# Patient Record
Sex: MALE | Race: WHITE | Employment: FULL TIME | URBAN - METROPOLITAN AREA
[De-identification: names, ages, dates, MRNs, and addresses within clinical notes are randomized per-mention and may not be internally consistent; named-entity substitution may affect disease eponyms.]

---

## 2017-03-19 ENCOUNTER — HOSPITAL ENCOUNTER (OUTPATIENT)
Age: 52
Discharge: HOME | End: 2017-03-19
Attending: FAMILY MEDICINE
Payer: COMMERCIAL

## 2017-03-19 VITALS
SYSTOLIC BLOOD PRESSURE: 137 MMHG | RESPIRATION RATE: 18 BRPM | DIASTOLIC BLOOD PRESSURE: 96 MMHG | HEART RATE: 99 BPM | OXYGEN SATURATION: 98 % | WEIGHT: 230 LBS | TEMPERATURE: 99 F

## 2017-03-19 DIAGNOSIS — J06.9 UPPER RESPIRATORY TRACT INFECTION, UNSPECIFIED TYPE: Primary | ICD-10-CM

## 2017-03-19 PROCEDURE — 99213 OFFICE O/P EST LOW 20 MIN: CPT

## 2017-03-19 PROCEDURE — 99203 OFFICE O/P NEW LOW 30 MIN: CPT

## 2017-03-19 RX ORDER — OMEPRAZOLE 20 MG/1
20 CAPSULE, DELAYED RELEASE ORAL
COMMUNITY
End: 2021-06-07

## 2017-03-19 RX ORDER — CODEINE PHOSPHATE AND GUAIFENESIN 10; 100 MG/5ML; MG/5ML
SOLUTION ORAL NIGHTLY PRN
Qty: 120 ML | Refills: 0 | Status: SHIPPED | OUTPATIENT
Start: 2017-03-19 | End: 2017-09-28

## 2017-03-19 RX ORDER — FLUTICASONE PROPIONATE 50 MCG
SPRAY, SUSPENSION (ML) NASAL
Qty: 1 INHALER | Refills: 0 | Status: SHIPPED | OUTPATIENT
Start: 2017-03-19 | End: 2017-09-28

## 2017-03-19 NOTE — ED PROVIDER NOTES
Patient Seen in: [facility]    History   Patient presents with:  Cough/URI    Stated Complaint: COUGH X 4 DAYS    HPI    This 20-year-old male presents to the office with a four-day history of nasal congestion, postnasal drip, s distress, A and O times 3  HEAD: Normocephalic, atraumatic  EYES: Sclera anicteric,  conjunctiva normal.  EARS: Tympanic membranes normal, EAC's normal.  NOSE: Turbinates mildly swollen, no bleeding noted.   PHARYNX:  Cobblestoning is noted, no exudates not or as long as you are congested. You may continue with the Mucinex during the daytime. You may take 5-10 mL of Cheratussin with codeine at bedtime as needed for cough. Do not drive, drink alcohol or operate machinery while on this medication.   Push flui

## 2017-09-03 ENCOUNTER — APPOINTMENT (OUTPATIENT)
Dept: CT IMAGING | Facility: HOSPITAL | Age: 52
End: 2017-09-03
Attending: EMERGENCY MEDICINE
Payer: COMMERCIAL

## 2017-09-03 ENCOUNTER — HOSPITAL ENCOUNTER (EMERGENCY)
Facility: HOSPITAL | Age: 52
Discharge: HOME | End: 2017-09-03
Attending: EMERGENCY MEDICINE
Payer: COMMERCIAL

## 2017-09-03 ENCOUNTER — APPOINTMENT (OUTPATIENT)
Dept: GENERAL RADIOLOGY | Facility: HOSPITAL | Age: 52
End: 2017-09-03
Attending: EMERGENCY MEDICINE
Payer: COMMERCIAL

## 2017-09-03 VITALS
OXYGEN SATURATION: 99 % | BODY MASS INDEX: 29.82 KG/M2 | WEIGHT: 225 LBS | HEART RATE: 87 BPM | RESPIRATION RATE: 18 BRPM | DIASTOLIC BLOOD PRESSURE: 94 MMHG | HEIGHT: 73 IN | SYSTOLIC BLOOD PRESSURE: 135 MMHG

## 2017-09-03 DIAGNOSIS — R55 SYNCOPE AND COLLAPSE: Primary | ICD-10-CM

## 2017-09-03 DIAGNOSIS — T07.XXXA ABRASIONS OF MULTIPLE SITES: ICD-10-CM

## 2017-09-03 DIAGNOSIS — S09.90XA INJURY OF HEAD, INITIAL ENCOUNTER: ICD-10-CM

## 2017-09-03 LAB
ATRIAL RATE: 62 BPM
BASOPHILS # BLD AUTO: 0.04 X10(3) UL (ref 0–0.1)
BASOPHILS NFR BLD AUTO: 0.4 %
EOSINOPHIL # BLD AUTO: 0.08 X10(3) UL (ref 0–0.3)
EOSINOPHIL NFR BLD AUTO: 0.8 %
ERYTHROCYTE [DISTWIDTH] IN BLOOD BY AUTOMATED COUNT: 12.4 % (ref 11.5–16)
HCT VFR BLD AUTO: 47.7 % (ref 37–53)
HGB BLD-MCNC: 16.5 G/DL (ref 13–17)
IMMATURE GRANULOCYTE COUNT: 0.1 X10(3) UL (ref 0–1)
IMMATURE GRANULOCYTE RATIO %: 1 %
LYMPHOCYTES # BLD AUTO: 1.09 X10(3) UL (ref 0.9–4)
LYMPHOCYTES NFR BLD AUTO: 11.2 %
MCH RBC QN AUTO: 31.9 PG (ref 27–33.2)
MCHC RBC AUTO-ENTMCNC: 34.6 G/DL (ref 31–37)
MCV RBC AUTO: 92.1 FL (ref 80–99)
MONOCYTES # BLD AUTO: 0.64 X10(3) UL (ref 0.1–0.6)
MONOCYTES NFR BLD AUTO: 6.6 %
NEUTROPHIL ABS PRELIM: 7.81 X10 (3) UL (ref 1.3–6.7)
NEUTROPHILS # BLD AUTO: 7.81 X10(3) UL (ref 1.3–6.7)
NEUTROPHILS NFR BLD AUTO: 80 %
P AXIS: 29 DEGREES
P-R INTERVAL: 220 MS
PLATELET # BLD AUTO: 202 10(3)UL (ref 150–450)
Q-T INTERVAL: 406 MS
QRS DURATION: 94 MS
QTC CALCULATION (BEZET): 412 MS
R AXIS: -1 DEGREES
RBC # BLD AUTO: 5.18 X10(6)UL (ref 4.3–5.7)
RED CELL DISTRIBUTION WIDTH-SD: 41.9 FL (ref 35.1–46.3)
T AXIS: 6 DEGREES
VENTRICULAR RATE: 62 BPM
WBC # BLD AUTO: 9.8 X10(3) UL (ref 4–13)

## 2017-09-03 PROCEDURE — 85025 COMPLETE CBC W/AUTO DIFF WBC: CPT | Performed by: EMERGENCY MEDICINE

## 2017-09-03 PROCEDURE — 96360 HYDRATION IV INFUSION INIT: CPT

## 2017-09-03 PROCEDURE — 70450 CT HEAD/BRAIN W/O DYE: CPT | Performed by: EMERGENCY MEDICINE

## 2017-09-03 PROCEDURE — 93005 ELECTROCARDIOGRAM TRACING: CPT

## 2017-09-03 PROCEDURE — 93010 ELECTROCARDIOGRAM REPORT: CPT

## 2017-09-03 PROCEDURE — 99285 EMERGENCY DEPT VISIT HI MDM: CPT

## 2017-09-03 PROCEDURE — 73562 X-RAY EXAM OF KNEE 3: CPT | Performed by: EMERGENCY MEDICINE

## 2017-09-03 PROCEDURE — 90471 IMMUNIZATION ADMIN: CPT

## 2017-09-03 PROCEDURE — 72125 CT NECK SPINE W/O DYE: CPT | Performed by: EMERGENCY MEDICINE
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
## 2017-09-28 ENCOUNTER — APPOINTMENT (OUTPATIENT)
Dept: CT IMAGING | Age: 52
End: 2017-09-28
Attending: FAMILY MEDICINE
Payer: COMMERCIAL

## 2017-09-28 ENCOUNTER — HOSPITAL ENCOUNTER (OUTPATIENT)
Age: 52
Discharge: HOME | End: 2017-09-28
Attending: FAMILY MEDICINE
Payer: COMMERCIAL

## 2017-09-28 VITALS
OXYGEN SATURATION: 97 % | SYSTOLIC BLOOD PRESSURE: 144 MMHG | DIASTOLIC BLOOD PRESSURE: 93 MMHG | RESPIRATION RATE: 20 BRPM | HEART RATE: 88 BPM | BODY MASS INDEX: 30 KG/M2 | TEMPERATURE: 98 F | WEIGHT: 225 LBS

## 2017-09-28 DIAGNOSIS — S09.90XD INJURY OF HEAD, SUBSEQUENT ENCOUNTER: Primary | ICD-10-CM

## 2017-09-28 DIAGNOSIS — S16.1XXD STRAIN OF NECK MUSCLE, SUBSEQUENT ENCOUNTER: ICD-10-CM

## 2017-09-28 PROCEDURE — 70450 CT HEAD/BRAIN W/O DYE: CPT | Performed by: FAMILY MEDICINE

## 2017-09-28 PROCEDURE — 99214 OFFICE O/P EST MOD 30 MIN: CPT
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
## 2019-12-23 ENCOUNTER — HOSPITAL (OUTPATIENT)
Dept: OTHER | Age: 54
End: 2019-12-23
Attending: EMERGENCY MEDICINE

## 2019-12-26 LAB
CULTURE STREP GRP A (STTH) HL: NORMAL

## 2021-06-04 ENCOUNTER — APPOINTMENT (OUTPATIENT)
Dept: GENERAL RADIOLOGY | Age: 56
End: 2021-06-04
Attending: PHYSICIAN ASSISTANT
Payer: COMMERCIAL

## 2021-06-04 ENCOUNTER — TELEPHONE (OUTPATIENT)
Dept: ORTHOPEDICS CLINIC | Facility: CLINIC | Age: 56
End: 2021-06-04

## 2021-06-04 ENCOUNTER — HOSPITAL ENCOUNTER (OUTPATIENT)
Age: 56
Discharge: HOME | End: 2021-06-04
Payer: COMMERCIAL

## 2021-06-04 VITALS
HEART RATE: 87 BPM | BODY MASS INDEX: 29.16 KG/M2 | DIASTOLIC BLOOD PRESSURE: 91 MMHG | RESPIRATION RATE: 14 BRPM | HEIGHT: 73 IN | TEMPERATURE: 98 F | SYSTOLIC BLOOD PRESSURE: 151 MMHG | WEIGHT: 220 LBS | OXYGEN SATURATION: 96 %

## 2021-06-04 DIAGNOSIS — M25.562 ACUTE PAIN OF LEFT KNEE: ICD-10-CM

## 2021-06-04 DIAGNOSIS — S62.511A CLOSED DISPLACED FRACTURE OF PROXIMAL PHALANX OF RIGHT THUMB, INITIAL ENCOUNTER: Primary | ICD-10-CM

## 2021-06-04 PROCEDURE — 99204 OFFICE O/P NEW MOD 45 MIN: CPT

## 2021-06-04 PROCEDURE — 73562 X-RAY EXAM OF KNEE 3: CPT | Performed by: PHYSICIAN ASSISTANT

## 2021-06-04 PROCEDURE — 73130 X-RAY EXAM OF HAND: CPT | Performed by: PHYSICIAN ASSISTANT

## 2021-06-04 PROCEDURE — 99203 OFFICE O/P NEW LOW 30 MIN: CPT

## 2021-06-04 PROCEDURE — 29125 APPL SHORT ARM SPLINT STATIC: CPT

## 2021-06-04 NOTE — ED PROVIDER NOTES
Patient Seen in: [facility]      History   Patient presents with:  Knee Pain  Hand Pain    Stated Complaint: left knee pain / thumb pain    HPI/Subjective:   HPI    56-year-old male presents to the immediate care for evaluation of left kn Cardiovascular:      Rate and Rhythm: Normal rate and regular rhythm. Pulmonary:      Effort: Pulmonary effort is normal.      Breath sounds: Normal breath sounds. Chest:      Chest wall: No tenderness. Abdominal:      Palpations: Abdomen is soft. moderate-sized joint effusion. OTHER:  Negative. CONCLUSION:  Chronic changes.   No acute fracture   Dictated by (CST): David Hoffmann MD on 6/04/2021 at 9:55 AM     Finalized by (CST): David Hoffmann MD on 6/04/2021 at 9:56 AM       XR H applied, Ace wrap provided for the knee. Informed patient to follow-up closely with Ortho. Patient is okay with ibuprofen for pain control at this time. All questions answered.      Splint check:  Splint Application  Thumb spica splint applied by MAYELIN ga

## 2021-06-04 NOTE — TELEPHONE ENCOUNTER
Patient had a fall and hurt his left knee. X-ray shows no fracture. However. patient might cancel appointment if he feels better within the next week. X-ray can be viewed in Epic. If additional imaging needed please place RX. Thank you.    Future Appoi

## 2021-06-04 NOTE — TELEPHONE ENCOUNTER
Spoke with patient and scheduled him, per Dr. Cierra Ordoñez, on Monday at 9:45am, location confirmed. Pt verbalized understanding. No further questions at this time.

## 2021-06-04 NOTE — TELEPHONE ENCOUNTER
Patient's wife called to request an appointment for patient due to a rt hand thumb injury . Patient has  a comminuted displaced fracture involving the ulnar base of the right proximal phalanx of the 1st digit. Patient thumb is in a split .     X-ray can be v

## 2021-06-07 ENCOUNTER — OFFICE VISIT (OUTPATIENT)
Dept: ORTHOPEDICS CLINIC | Facility: CLINIC | Age: 56
End: 2021-06-07
Payer: COMMERCIAL

## 2021-06-07 ENCOUNTER — LAB ENCOUNTER (OUTPATIENT)
Dept: LAB | Age: 56
End: 2021-06-07
Attending: ORTHOPAEDIC SURGERY
Payer: COMMERCIAL

## 2021-06-07 VITALS — OXYGEN SATURATION: 99 % | HEART RATE: 95 BPM

## 2021-06-07 DIAGNOSIS — S62.521A: ICD-10-CM

## 2021-06-07 DIAGNOSIS — S62.511A CLOSED DISPLACED FRACTURE OF PROXIMAL PHALANX OF RIGHT THUMB, INITIAL ENCOUNTER: Primary | ICD-10-CM

## 2021-06-07 PROCEDURE — 99204 OFFICE O/P NEW MOD 45 MIN: CPT | Performed by: ORTHOPAEDIC SURGERY

## 2021-06-07 RX ORDER — ACETAMINOPHEN 500 MG
1000 TABLET ORAL ONCE
Status: CANCELLED | OUTPATIENT
Start: 2021-06-07 | End: 2021-06-07

## 2021-06-07 NOTE — PROGRESS NOTES
Surgeon: Dr. Diania Epley     Date of Surgery: 6/9/21    Time: 11:10    Post Op: 6/24/21     Facility: [facility], scheduling sheet to referral team?N/A     Is this work comp related?  No    Surgical Assistant Obtained: YES, FAXED TO [facility]

## 2021-06-07 NOTE — PROGRESS NOTES
Chas Rogers's case has been scheduled/rescheduled at 1055 on 6/9/2021 at [facility]  Received:  Today  Mackenzie Hopkins, [address]  Patient Name: Rebecca Durand    MRN: [id number]     OPEN REDUCTION INTERNAL FIXATION OF RIGHT THUMB [facility]
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
## 2021-06-07 NOTE — PROGRESS NOTES
OR BOOKING SHEET   Name: Suzanne Stallworth  MRN: [id number]   : 10/27/1965  Diagnosis:  [x] Closed displaced fracture of proximal phalanx of right thumb, initial encounter [G80.607U]    Disposition:    [x] Outpatient  [] Overnight for JASBIR  [] Overnight for C-arm  []  Arthrex 3.5 mm Swivel Locks   []  3-0 Supramid Suture  [x]  26 & 28 gauge cerclage wire  [x] K wires  [x]  Aristeo needle  [x] 2-0 Fiberwire    Positioning:   [x]  Supine with arm table on OR Table  []  Supine with arm table on transport cart    A

## 2021-06-08 ENCOUNTER — OFFICE VISIT (OUTPATIENT)
Dept: INTERNAL MEDICINE CLINIC | Facility: CLINIC | Age: 56
End: 2021-06-08
Payer: COMMERCIAL

## 2021-06-08 VITALS
HEIGHT: 72 IN | RESPIRATION RATE: 16 BRPM | SYSTOLIC BLOOD PRESSURE: 130 MMHG | BODY MASS INDEX: 32.64 KG/M2 | TEMPERATURE: 98 F | HEART RATE: 85 BPM | OXYGEN SATURATION: 98 % | DIASTOLIC BLOOD PRESSURE: 78 MMHG | WEIGHT: 241 LBS

## 2021-06-08 DIAGNOSIS — Z01.810 PREOP CARDIOVASCULAR EXAM: Primary | ICD-10-CM

## 2021-06-08 DIAGNOSIS — S62.521K: ICD-10-CM

## 2021-06-08 DIAGNOSIS — Z13.0 SCREENING, IRON DEFICIENCY ANEMIA: ICD-10-CM

## 2021-06-08 DIAGNOSIS — Z13.89 SCREENING FOR GENITOURINARY CONDITION: ICD-10-CM

## 2021-06-08 DIAGNOSIS — Z13.29 THYROID DISORDER SCREEN: ICD-10-CM

## 2021-06-08 DIAGNOSIS — Z00.00 LABORATORY EXAMINATION ORDERED AS PART OF A ROUTINE GENERAL MEDICAL EXAMINATION: ICD-10-CM

## 2021-06-08 DIAGNOSIS — Z01.84 IMMUNITY STATUS TESTING: ICD-10-CM

## 2021-06-08 DIAGNOSIS — Z13.220 LIPID SCREENING: ICD-10-CM

## 2021-06-08 DIAGNOSIS — Z12.5 PROSTATE CANCER SCREENING: ICD-10-CM

## 2021-06-08 PROBLEM — M54.81 BILATERAL OCCIPITAL NEURALGIA: Status: ACTIVE | Noted: 2019-07-15

## 2021-06-08 PROBLEM — M54.81 BILATERAL OCCIPITAL NEURALGIA: Status: RESOLVED | Noted: 2019-07-15 | Resolved: 2021-06-08

## 2021-06-08 PROCEDURE — 3075F SYST BP GE 130 - 139MM HG: CPT | Performed by: INTERNAL MEDICINE

## 2021-06-08 PROCEDURE — 3008F BODY MASS INDEX DOCD: CPT | Performed by: INTERNAL MEDICINE

## 2021-06-08 PROCEDURE — 99204 OFFICE O/P NEW MOD 45 MIN: CPT | Performed by: INTERNAL MEDICINE

## 2021-06-08 PROCEDURE — 3078F DIAST BP <80 MM HG: CPT | Performed by: INTERNAL MEDICINE

## 2021-06-08 NOTE — PATIENT INSTRUCTIONS
Broken Thumb, Closed  You have a broken (fractured) thumb. This causes pain, swelling, and often bruising in and around your thumb.  This injury will usually take about 4 to 6 weeks or longer to heal. Thumb fractures may be treated with a splint or cast. healthcare provider before using these medicines. Also talk with your provider if [facility] Bret had a stomach ulcer or gastrointestinal bleeding. · Don’t put creams or objects under the cast if you have itching.   Follow-up care  Follow up with your healthcare pr

## 2021-06-08 NOTE — H&P
[address] [facility] Formerly named [facility]    History and Physical    Francesca Morneo Patient Status:  No patient class for patient encounter    10/27/1965 MRN [id number]   Location [address] [facility], [address] [facility] Attending No att. tyrell 12:51 PM     EXERCISE FINDINGS:  The patient exercised for 13 minutes on a  Nasir protocol achieving 12.9 METs, a maximal blood pressure of 180/88 mmHg and a maximum heart rate of 181 beats per minute.  Exercise was discontinued due to fatigue.      The neva or anxiety  HEMATOLOGIC: denies hx of anemia  ENDOCRINE: denies thyroid history  ALL/ASTHMA: denies hx of allergy or asthma    EXAM:   /78   Pulse 85   Temp 98 °F (36.7 °C)   Resp 16   Ht 6' (1.829 m)   Wt 241 lb (109.3 kg)   SpO2 98%   BMI 32.69 kg/ Alcohol use: Yes      Alcohol/week: 3.0 - 4.0 standard drinks      Types: 3 - 4 Standard drinks or equivalent per week      Comment: occ    Drug use: Never    Allergies/Medications:    Allergies: No Known Allergies  (Not in a hospital admission)      Review

## 2021-06-09 ENCOUNTER — HOSPITAL ENCOUNTER (OUTPATIENT)
Facility: HOSPITAL | Age: 56
Setting detail: HOSPITAL OUTPATIENT SURGERY
Discharge: HOME | End: 2021-06-09
Attending: ORTHOPAEDIC SURGERY | Admitting: ORTHOPAEDIC SURGERY
Payer: COMMERCIAL

## 2021-06-09 ENCOUNTER — APPOINTMENT (OUTPATIENT)
Dept: GENERAL RADIOLOGY | Facility: HOSPITAL | Age: 56
End: 2021-06-09
Attending: ORTHOPAEDIC SURGERY
Payer: COMMERCIAL

## 2021-06-09 ENCOUNTER — ANESTHESIA EVENT (OUTPATIENT)
Dept: SURGERY | Facility: HOSPITAL | Age: 56
End: 2021-06-09
Payer: COMMERCIAL

## 2021-06-09 ENCOUNTER — ANESTHESIA (OUTPATIENT)
Dept: SURGERY | Facility: HOSPITAL | Age: 56
End: 2021-06-09
Payer: COMMERCIAL

## 2021-06-09 VITALS
HEIGHT: 73 IN | BODY MASS INDEX: 31.68 KG/M2 | HEART RATE: 75 BPM | WEIGHT: 239 LBS | RESPIRATION RATE: 18 BRPM | TEMPERATURE: 97 F | DIASTOLIC BLOOD PRESSURE: 96 MMHG | SYSTOLIC BLOOD PRESSURE: 143 MMHG | OXYGEN SATURATION: 100 %

## 2021-06-09 DIAGNOSIS — S62.511A CLOSED DISPLACED FRACTURE OF PROXIMAL PHALANX OF RIGHT THUMB, INITIAL ENCOUNTER: ICD-10-CM

## 2021-06-09 DIAGNOSIS — S62.521A: Primary | ICD-10-CM

## 2021-06-09 PROCEDURE — 76000 FLUOROSCOPY <1 HR PHYS/QHP: CPT | Performed by: ORTHOPAEDIC SURGERY

## 2021-06-09 PROCEDURE — 3E0T3BZ INTRODUCTION OF ANESTHETIC AGENT INTO PERIPHERAL NERVES AND PLEXI, PERCUTANEOUS APPROACH: ICD-10-PCS | Performed by: ANESTHESIOLOGY

## 2021-06-09 PROCEDURE — 76942 ECHO GUIDE FOR BIOPSY: CPT | Performed by: ANESTHESIOLOGY

## 2021-06-09 PROCEDURE — 0PSR04Z REPOSITION RIGHT THUMB PHALANX WITH INTERNAL FIXATION DEVICE, OPEN APPROACH: ICD-10-PCS | Performed by: ORTHOPAEDIC SURGERY

## 2021-06-09 DEVICE — WIRE K SMALL .035: Type: IMPLANTABLE DEVICE | Site: HAND | Status: FUNCTIONAL

## 2021-06-09 RX ORDER — HYDROCODONE BITARTRATE AND ACETAMINOPHEN 5; 325 MG/1; MG/1
2 TABLET ORAL AS NEEDED
Status: DISCONTINUED | OUTPATIENT
Start: 2021-06-09 | End: 2021-06-09

## 2021-06-09 RX ORDER — MIDAZOLAM HYDROCHLORIDE 1 MG/ML
INJECTION INTRAMUSCULAR; INTRAVENOUS AS NEEDED
Status: DISCONTINUED | OUTPATIENT
Start: 2021-06-09 | End: 2021-06-09 | Stop reason: SURG

## 2021-06-09 RX ORDER — NALOXONE HYDROCHLORIDE 0.4 MG/ML
80 INJECTION, SOLUTION INTRAMUSCULAR; INTRAVENOUS; SUBCUTANEOUS AS NEEDED
Status: DISCONTINUED | OUTPATIENT
Start: 2021-06-09 | End: 2021-06-09

## 2021-06-09 RX ORDER — HYDROMORPHONE HYDROCHLORIDE 1 MG/ML
0.4 INJECTION, SOLUTION INTRAMUSCULAR; INTRAVENOUS; SUBCUTANEOUS EVERY 5 MIN PRN
Status: DISCONTINUED | OUTPATIENT
Start: 2021-06-09 | End: 2021-06-09

## 2021-06-09 RX ORDER — METOCLOPRAMIDE HYDROCHLORIDE 5 MG/ML
INJECTION INTRAMUSCULAR; INTRAVENOUS AS NEEDED
Status: DISCONTINUED | OUTPATIENT
Start: 2021-06-09 | End: 2021-06-09 | Stop reason: SURG

## 2021-06-09 RX ORDER — SODIUM CHLORIDE, SODIUM LACTATE, POTASSIUM CHLORIDE, CALCIUM CHLORIDE 600; 310; 30; 20 MG/100ML; MG/100ML; MG/100ML; MG/100ML
INJECTION, SOLUTION INTRAVENOUS CONTINUOUS
Status: DISCONTINUED | OUTPATIENT
Start: 2021-06-09 | End: 2021-06-09

## 2021-06-09 RX ORDER — MEPERIDINE HYDROCHLORIDE 25 MG/ML
12.5 INJECTION INTRAMUSCULAR; INTRAVENOUS; SUBCUTANEOUS AS NEEDED
Status: DISCONTINUED | OUTPATIENT
Start: 2021-06-09 | End: 2021-06-09

## 2021-06-09 RX ORDER — HYDROCODONE BITARTRATE AND ACETAMINOPHEN 5; 325 MG/1; MG/1
1 TABLET ORAL AS NEEDED
Status: DISCONTINUED | OUTPATIENT
Start: 2021-06-09 | End: 2021-06-09

## 2021-06-09 RX ORDER — BUPIVACAINE HYDROCHLORIDE 5 MG/ML
INJECTION, SOLUTION EPIDURAL; INTRACAUDAL AS NEEDED
Status: DISCONTINUED | OUTPATIENT
Start: 2021-06-09 | End: 2021-06-09 | Stop reason: SURG

## 2021-06-09 RX ORDER — ONDANSETRON 2 MG/ML
INJECTION INTRAMUSCULAR; INTRAVENOUS AS NEEDED
Status: DISCONTINUED | OUTPATIENT
Start: 2021-06-09 | End: 2021-06-09 | Stop reason: SURG

## 2021-06-09 RX ORDER — ACETAMINOPHEN 500 MG
1000 TABLET ORAL ONCE
COMMUNITY
End: 2021-06-24

## 2021-06-09 RX ORDER — DEXAMETHASONE SODIUM PHOSPHATE 4 MG/ML
VIAL (ML) INJECTION AS NEEDED
Status: DISCONTINUED | OUTPATIENT
Start: 2021-06-09 | End: 2021-06-09 | Stop reason: SURG

## 2021-06-09 RX ORDER — LIDOCAINE HYDROCHLORIDE 10 MG/ML
INJECTION, SOLUTION EPIDURAL; INFILTRATION; INTRACAUDAL; PERINEURAL AS NEEDED
Status: DISCONTINUED | OUTPATIENT
Start: 2021-06-09 | End: 2021-06-09 | Stop reason: SURG

## 2021-06-09 RX ORDER — CEFAZOLIN SODIUM/WATER 2 G/20 ML
2 SYRINGE (ML) INTRAVENOUS ONCE
Status: COMPLETED | OUTPATIENT
Start: 2021-06-09 | End: 2021-06-09

## 2021-06-09 RX ORDER — KETOROLAC TROMETHAMINE 30 MG/ML
INJECTION, SOLUTION INTRAMUSCULAR; INTRAVENOUS AS NEEDED
Status: DISCONTINUED | OUTPATIENT
Start: 2021-06-09 | End: 2021-06-09 | Stop reason: SURG

## 2021-06-09 RX ORDER — HYDROCODONE BITARTRATE AND ACETAMINOPHEN 5; 325 MG/1; MG/1
1 TABLET ORAL EVERY 6 HOURS PRN
Qty: 20 TABLET | Refills: 0 | Status: SHIPPED | OUTPATIENT
Start: 2021-06-09 | End: 2021-06-24

## 2021-06-09 RX ORDER — ACETAMINOPHEN 500 MG
1000 TABLET ORAL ONCE AS NEEDED
Status: DISCONTINUED | OUTPATIENT
Start: 2021-06-09 | End: 2021-06-09

## 2021-06-09 RX ADMIN — MIDAZOLAM HYDROCHLORIDE 2 MG: 1 INJECTION INTRAMUSCULAR; INTRAVENOUS at 12:11:00

## 2021-06-09 RX ADMIN — ONDANSETRON 4 MG: 2 INJECTION INTRAMUSCULAR; INTRAVENOUS at 12:20:00

## 2021-06-09 RX ADMIN — BUPIVACAINE HYDROCHLORIDE 20 ML: 5 INJECTION, SOLUTION EPIDURAL; INTRACAUDAL at 12:13:00

## 2021-06-09 RX ADMIN — MIDAZOLAM HYDROCHLORIDE 2 MG: 1 INJECTION INTRAMUSCULAR; INTRAVENOUS at 12:06:00

## 2021-06-09 RX ADMIN — KETOROLAC TROMETHAMINE 30 MG: 30 INJECTION, SOLUTION INTRAMUSCULAR; INTRAVENOUS at 13:46:00

## 2021-06-09 RX ADMIN — LIDOCAINE HYDROCHLORIDE 50 MG: 10 INJECTION, SOLUTION EPIDURAL; INFILTRATION; INTRACAUDAL; PERINEURAL at 12:06:00

## 2021-06-09 RX ADMIN — METOCLOPRAMIDE HYDROCHLORIDE 10 MG: 5 INJECTION INTRAMUSCULAR; INTRAVENOUS at 12:20:00

## 2021-06-09 RX ADMIN — SODIUM CHLORIDE, SODIUM LACTATE, POTASSIUM CHLORIDE, CALCIUM CHLORIDE: 600; 310; 30; 20 INJECTION, SOLUTION INTRAVENOUS at 12:06:00

## 2021-06-09 RX ADMIN — DEXAMETHASONE SODIUM PHOSPHATE 4 MG: 4 MG/ML VIAL (ML) INJECTION at 12:20:00

## 2021-06-09 RX ADMIN — CEFAZOLIN SODIUM/WATER 2 G: 2 G/20 ML SYRINGE (ML) INTRAVENOUS at 12:22:00

## 2021-06-09 RX ADMIN — SODIUM CHLORIDE, SODIUM LACTATE, POTASSIUM CHLORIDE, CALCIUM CHLORIDE: 600; 310; 30; 20 INJECTION, SOLUTION INTRAVENOUS at 14:18:00

## 2021-06-09 NOTE — ANESTHESIA POSTPROCEDURE EVALUATION
[id number] y 28 Patient Status:  Hospital Outpatient Surgery   Age/Gender 60year old male MRN [id number]   Location [address] [place] [address] Attending Vika Tan MD   [facility] Day # 0 PCP Juan Jennings MD       Anesthesia Pos

## 2021-06-09 NOTE — ANESTHESIA PROCEDURE NOTES
[facility]  Performed by: Mir Gaming MD  Authorized by: Mir Gaming MD       General Information and Staff    Start Time:  6/9/2021 12:12 PM  End Time:  6/9/2021 12:13 PM  Anesthesiologist:  Mir Gaming MD  Performed by:

## 2021-06-09 NOTE — INTERVAL H&P NOTE
Pre-op Diagnosis: Closed displaced fracture of proximal phalanx of right thumb, initial encounter [S62.046U]    The above referenced H&P was reviewed by Shane Savage MD on 6/9/2021, the patient was examined and no significant changes have occurred in the

## 2021-06-09 NOTE — BRIEF OP NOTE
Pre-Operative Diagnosis: Closed displaced fracture of proximal phalanx of right thumb, initial encounter [G52.158A]     Post-Operative Diagnosis: Closed displaced fracture of proximal phalanx of right thumb, initial encounter [X62.448B]      Procedure Perf

## 2021-06-09 NOTE — ANESTHESIA PREPROCEDURE EVALUATION
PRE-OP EVALUATION    Patient Name: Giacomo Parents    Admit Diagnosis: Closed displaced fracture of proximal phalanx of right thumb, initial encounter [S62.695A]    Pre-op Diagnosis: Closed displaced fracture of proximal phalanx of right thumb, initial enco exam normal.         Dental    No notable dental history. Pulmonary    Pulmonary exam normal.                 Other findings            ASA: 1   Plan: general, regional and MAC  NPO status verified and patient meets guidelines.     Post-procedure pa

## 2021-06-10 ENCOUNTER — TELEPHONE (OUTPATIENT)
Dept: ORTHOPEDICS CLINIC | Facility: CLINIC | Age: 56
End: 2021-06-10

## 2021-06-10 DIAGNOSIS — S62.511A CLOSED DISPLACED FRACTURE OF PROXIMAL PHALANX OF RIGHT THUMB, INITIAL ENCOUNTER: Primary | ICD-10-CM

## 2021-06-10 NOTE — TELEPHONE ENCOUNTER
Patient would like to know if Physical Therapy is needed for him. And if needed to create an order for patient. Please advise. He would like a return call if possible. Thanks.     Patient can be reached at [phone]

## 2021-06-11 ENCOUNTER — OFFICE VISIT (OUTPATIENT)
Dept: OCCUPATIONAL MEDICINE | Facility: HOSPITAL | Age: 56
End: 2021-06-11
Attending: ORTHOPAEDIC SURGERY
Payer: COMMERCIAL

## 2021-06-11 ENCOUNTER — TELEPHONE (OUTPATIENT)
Dept: PHYSICAL THERAPY | Facility: HOSPITAL | Age: 56
End: 2021-06-11

## 2021-06-11 PROCEDURE — 97760 ORTHOTIC MGMT&TRAING 1ST ENC: CPT

## 2021-06-11 NOTE — TELEPHONE ENCOUNTER
Patient informed of MD recommendations below, patient verbalized understanding with intent to comply.      Esther Camarena MD      OT to start now

## 2021-06-11 NOTE — PROGRESS NOTES
SPLINT  EVALUATION:   Referring Physician: Dr. Lata Lerma  Diagnosis:  Closed displaced fracture of proximal phalanx of right thumb, initial encounter (F50.077P) Date of Service: 6/11/2021     PATIENT SUMMARY   Dayna Scott is a 60year old male who presen (to be met in 8 visits)  Pt will demonstrate independence with don/doff splint. MET  Pt will verbalize understanding of splint precautions and instructions for splint care.  MET  * Pt will remove splint 3x daily to perform R thumb IP blocking exercises  Flx

## 2021-06-13 ENCOUNTER — HOSPITAL ENCOUNTER (EMERGENCY)
Facility: HOSPITAL | Age: 56
Discharge: HOME | End: 2021-06-13
Attending: EMERGENCY MEDICINE
Payer: COMMERCIAL

## 2021-06-13 VITALS
TEMPERATURE: 97 F | SYSTOLIC BLOOD PRESSURE: 137 MMHG | RESPIRATION RATE: 18 BRPM | OXYGEN SATURATION: 98 % | HEART RATE: 92 BPM | DIASTOLIC BLOOD PRESSURE: 94 MMHG

## 2021-06-13 DIAGNOSIS — Z48.89 ENCOUNTER FOR POSTOPERATIVE WOUND CHECK: Primary | ICD-10-CM

## 2021-06-13 PROCEDURE — 99281 EMR DPT VST MAYX REQ PHY/QHP: CPT
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
## 2021-06-17 ENCOUNTER — OFFICE VISIT (OUTPATIENT)
Dept: OCCUPATIONAL MEDICINE | Facility: HOSPITAL | Age: 56
End: 2021-06-17
Attending: ORTHOPAEDIC SURGERY
Payer: COMMERCIAL

## 2021-06-17 PROCEDURE — 97760 ORTHOTIC MGMT&TRAING 1ST ENC: CPT

## 2021-06-19 NOTE — PROGRESS NOTES
Dx: Closed displaced fracture of proximal phalanx of right thumb, initial encounter (J10.285R)        Insurance (Authorized # of Visits): 2/ 6         Authorizing Physician: Dr. Cortes Cunha  Next MD visit: 6/24/21  (Jame Sierra)  Fall Risk: standard         Precaut

## 2021-06-24 ENCOUNTER — OFFICE VISIT (OUTPATIENT)
Dept: ORTHOPEDICS CLINIC | Facility: CLINIC | Age: 56
End: 2021-06-24
Payer: COMMERCIAL

## 2021-06-24 ENCOUNTER — TELEPHONE (OUTPATIENT)
Dept: PHYSICAL THERAPY | Facility: HOSPITAL | Age: 56
End: 2021-06-24

## 2021-06-24 ENCOUNTER — HOSPITAL ENCOUNTER (OUTPATIENT)
Dept: GENERAL RADIOLOGY | Age: 56
Discharge: HOME | End: 2021-06-24
Attending: ORTHOPAEDIC SURGERY
Payer: COMMERCIAL

## 2021-06-24 VITALS — HEART RATE: 78 BPM | OXYGEN SATURATION: 98 %

## 2021-06-24 DIAGNOSIS — S62.511A CLOSED DISPLACED FRACTURE OF PROXIMAL PHALANX OF RIGHT THUMB, INITIAL ENCOUNTER: ICD-10-CM

## 2021-06-24 DIAGNOSIS — S62.511A CLOSED DISPLACED FRACTURE OF PROXIMAL PHALANX OF RIGHT THUMB, INITIAL ENCOUNTER: Primary | ICD-10-CM

## 2021-06-24 PROCEDURE — 73140 X-RAY EXAM OF FINGER(S): CPT | Performed by: ORTHOPAEDIC SURGERY

## 2021-06-24 PROCEDURE — 99024 POSTOP FOLLOW-UP VISIT: CPT | Performed by: ORTHOPAEDIC SURGERY

## 2021-06-24 NOTE — PROGRESS NOTES
[facility] Post-Op Clinic Visit    A/P: 60year old  male s/p right thumb open reduction internal fixation of ulnar collateral ligament avulsion fracture progressing as expected. Okay to transition in 2 weeks to a hand-based short opponens splint.   I will

## 2021-07-01 NOTE — OPERATIVE REPORT
Operative Note    Patient Name: Justin Beltran    Preoperative Diagnosis:     1. Closed displaced fracture of proximal phalanx of right thumb, initial encounter [N42.150A]    Postoperative Diagnosis:     1.  Closed displaced fracture of proximal phalanx of the dermis. Erich Cordon scissors was used to dissect down to the adductor fascia where the nerve branch was identified and protected with it being retracted volarly. The adductor fascia was longitudinally incised and tagged for future repair.   15 blade close the capsule. 4-0 PDS in a buried figure-of-eight fashion was used to reapproximate the adductor fascia. 4-0 Monocryl was used to approximate the subcutaneous tissue in a simple buried interrupted fashion.   A 5-0 Monocryl was run a subcuticular fash

## 2021-07-02 ENCOUNTER — APPOINTMENT (OUTPATIENT)
Dept: OCCUPATIONAL MEDICINE | Facility: HOSPITAL | Age: 56
End: 2021-07-02
Attending: ORTHOPAEDIC SURGERY
Payer: COMMERCIAL

## 2021-07-07 ENCOUNTER — APPOINTMENT (OUTPATIENT)
Dept: OCCUPATIONAL MEDICINE | Facility: HOSPITAL | Age: 56
End: 2021-07-07
Attending: ORTHOPAEDIC SURGERY
Payer: COMMERCIAL

## 2021-07-08 ENCOUNTER — OFFICE VISIT (OUTPATIENT)
Dept: ORTHOPEDICS CLINIC | Facility: CLINIC | Age: 56
End: 2021-07-08
Payer: COMMERCIAL

## 2021-07-08 ENCOUNTER — HOSPITAL ENCOUNTER (OUTPATIENT)
Dept: GENERAL RADIOLOGY | Age: 56
Discharge: HOME | End: 2021-07-08
Attending: ORTHOPAEDIC SURGERY
Payer: COMMERCIAL

## 2021-07-08 VITALS — HEART RATE: 83 BPM | OXYGEN SATURATION: 99 %

## 2021-07-08 DIAGNOSIS — S62.511A CLOSED DISPLACED FRACTURE OF PROXIMAL PHALANX OF RIGHT THUMB, INITIAL ENCOUNTER: ICD-10-CM

## 2021-07-08 DIAGNOSIS — S62.511A CLOSED DISPLACED FRACTURE OF PROXIMAL PHALANX OF RIGHT THUMB, INITIAL ENCOUNTER: Primary | ICD-10-CM

## 2021-07-08 PROCEDURE — 99024 POSTOP FOLLOW-UP VISIT: CPT | Performed by: ORTHOPAEDIC SURGERY

## 2021-07-08 PROCEDURE — 73140 X-RAY EXAM OF FINGER(S): CPT | Performed by: ORTHOPAEDIC SURGERY

## 2021-07-08 NOTE — PROGRESS NOTES
[facility] Post-[facility] Clinic Visit    A/P: 60year old  male s/p right thumb open reduction internal fixation of ulnar collateral ligament avulsion fracture progressing as expected. Will wait 1 more week prior to pulling pin.      Pain: current regimen adequate

## 2021-07-12 ENCOUNTER — APPOINTMENT (OUTPATIENT)
Dept: OCCUPATIONAL MEDICINE | Facility: HOSPITAL | Age: 56
End: 2021-07-12
Attending: ORTHOPAEDIC SURGERY
Payer: COMMERCIAL

## 2021-07-15 ENCOUNTER — HOSPITAL ENCOUNTER (OUTPATIENT)
Dept: GENERAL RADIOLOGY | Age: 56
Discharge: HOME | End: 2021-07-15
Attending: ORTHOPAEDIC SURGERY
Payer: COMMERCIAL

## 2021-07-15 ENCOUNTER — OFFICE VISIT (OUTPATIENT)
Dept: ORTHOPEDICS CLINIC | Facility: CLINIC | Age: 56
End: 2021-07-15
Payer: COMMERCIAL

## 2021-07-15 VITALS — OXYGEN SATURATION: 99 % | HEART RATE: 83 BPM

## 2021-07-15 DIAGNOSIS — S62.511A CLOSED DISPLACED FRACTURE OF PROXIMAL PHALANX OF RIGHT THUMB, INITIAL ENCOUNTER: ICD-10-CM

## 2021-07-15 DIAGNOSIS — S62.511A CLOSED DISPLACED FRACTURE OF PROXIMAL PHALANX OF RIGHT THUMB, INITIAL ENCOUNTER: Primary | ICD-10-CM

## 2021-07-15 PROCEDURE — 99024 POSTOP FOLLOW-UP VISIT: CPT | Performed by: ORTHOPAEDIC SURGERY

## 2021-07-15 PROCEDURE — 73140 X-RAY EXAM OF FINGER(S): CPT | Performed by: ORTHOPAEDIC SURGERY

## 2021-07-15 NOTE — PROGRESS NOTES
[facility] Post-Op Clinic Visit    A/P: 60year old male s/p right thumb open reduction internal fixation of ulnar collateral ligament avulsion fracture progressing as expected. Patient can begin strengthening at 6 weeks.   Can become more aggressive with r

## 2021-07-16 ENCOUNTER — APPOINTMENT (OUTPATIENT)
Dept: OCCUPATIONAL MEDICINE | Facility: HOSPITAL | Age: 56
End: 2021-07-16
Attending: ORTHOPAEDIC SURGERY
Payer: COMMERCIAL

## 2021-07-19 ENCOUNTER — OFFICE VISIT (OUTPATIENT)
Dept: OCCUPATIONAL MEDICINE | Facility: HOSPITAL | Age: 56
End: 2021-07-19
Attending: ORTHOPAEDIC SURGERY
Payer: COMMERCIAL

## 2021-07-19 PROCEDURE — 97166 OT EVAL MOD COMPLEX 45 MIN: CPT

## 2021-07-19 PROCEDURE — 97110 THERAPEUTIC EXERCISES: CPT

## 2021-07-19 NOTE — PROGRESS NOTES
OCCUPATIONAL THERAPY UPPER EXTREMITY EVALUATION   Referring Physician: Dr. Rosalee Casey  Diagnosis:  Closed displaced fracture of proximal phalanx of right thumb, initial encounter (U55.242X) Date of Service: 7/19/2021     PATIENT SUMMARY   Yumiko Galvin is a 3 :Closed displaced fracture of proximal phalanx of right thumb. Pt and OT discussed evaluation findings, pathology, POC and HEP. Pt voiced understanding and performs HEP correctly without reported pain.  Skilled Occupational Therapy is medically necessary t techniques.     Charges: OT Eval: Moderate Complexity, 1x TE      Total Timed Treatment: 55 min     Total Treatment Time: 55min     Based on clinical rationale and outcome measures, this evaluation involved Moderate Complexity decision making due to 1-2 per treatment and while under my care.     X___________________________________________________ Date____________________    Certification From: 5/75/1544  To:10/17/2021

## 2021-07-23 ENCOUNTER — OFFICE VISIT (OUTPATIENT)
Dept: OCCUPATIONAL MEDICINE | Facility: HOSPITAL | Age: 56
End: 2021-07-23
Attending: INTERNAL MEDICINE
Payer: COMMERCIAL

## 2021-07-23 PROCEDURE — 97110 THERAPEUTIC EXERCISES: CPT

## 2021-07-23 PROCEDURE — 97140 MANUAL THERAPY 1/> REGIONS: CPT

## 2021-07-23 NOTE — PROGRESS NOTES
Dx: Closed displaced fracture of proximal phalanx of right thumb, initial encounter (F62.849H)        Insurance (Authorized # of Visits): 4/ 8         Authorizing Physician: Dr. Sandeep Johnson MD visit: 8/12/21    Fall Risk: standard         Precautions: n/a clothespins blue and green all x2. R hand                          HEP: AROM and gentle blocking ex R thumb IP jnt.     Charges: 1x MT, 2x TE      Total Timed Treatment: 50 min  Total Treatment Time: 50 min

## 2021-07-26 ENCOUNTER — APPOINTMENT (OUTPATIENT)
Dept: OCCUPATIONAL MEDICINE | Facility: HOSPITAL | Age: 56
End: 2021-07-26
Attending: ORTHOPAEDIC SURGERY
Payer: COMMERCIAL

## 2021-07-30 ENCOUNTER — APPOINTMENT (OUTPATIENT)
Dept: OCCUPATIONAL MEDICINE | Facility: HOSPITAL | Age: 56
End: 2021-07-30
Attending: ORTHOPAEDIC SURGERY
Payer: COMMERCIAL

## 2021-08-03 ENCOUNTER — OFFICE VISIT (OUTPATIENT)
Dept: OCCUPATIONAL MEDICINE | Facility: HOSPITAL | Age: 56
End: 2021-08-03
Attending: ORTHOPAEDIC SURGERY
Payer: COMMERCIAL

## 2021-08-03 PROCEDURE — 97140 MANUAL THERAPY 1/> REGIONS: CPT

## 2021-08-03 PROCEDURE — 97110 THERAPEUTIC EXERCISES: CPT

## 2021-08-03 NOTE — PROGRESS NOTES
Dx: Closed displaced fracture of proximal phalanx of right thumb, initial encounter (Q88.755E)        Insurance (Authorized # of Visits): 5/ 8         Authorizing Physician: Dr. Bird Collins Next MD visit: 8/12/21    Fall Risk: standard         Precautions: n/a Average   :  112# (+50) 142# (+51)    2 pt Pinch: 14# (+1) 16#   3 pt Pinch:  15# (+4) 18#   Lateral Pinch:  22# (+5) 22#       Goals: (to be met in 12 visits)  1-  Decrease C/O pain to 0/10 with functional use 8 visits MET  2- Increase R thumb MP jnt rung  R hand Upgraded HEP, issued red theraputty w/ instructions      Tip pinch clothespins blue and green all x2. R hand Final measurements taken                         HEP: AROM and  blocking ex R thumb IP jnt.     Charges: 1x MT, 2x TE      Total Timed

## 2021-08-12 ENCOUNTER — HOSPITAL ENCOUNTER (OUTPATIENT)
Dept: GENERAL RADIOLOGY | Age: 56
Discharge: HOME | End: 2021-08-12
Attending: ORTHOPAEDIC SURGERY
Payer: COMMERCIAL

## 2021-08-12 ENCOUNTER — OFFICE VISIT (OUTPATIENT)
Dept: ORTHOPEDICS CLINIC | Facility: CLINIC | Age: 56
End: 2021-08-12
Payer: COMMERCIAL

## 2021-08-12 VITALS — OXYGEN SATURATION: 99 % | HEART RATE: 84 BPM

## 2021-08-12 DIAGNOSIS — S62.511A CLOSED DISPLACED FRACTURE OF PROXIMAL PHALANX OF RIGHT THUMB, INITIAL ENCOUNTER: Primary | ICD-10-CM

## 2021-08-12 DIAGNOSIS — S62.511A CLOSED DISPLACED FRACTURE OF PROXIMAL PHALANX OF RIGHT THUMB, INITIAL ENCOUNTER: ICD-10-CM

## 2021-08-12 PROCEDURE — 73140 X-RAY EXAM OF FINGER(S): CPT | Performed by: ORTHOPAEDIC SURGERY

## 2021-08-12 PROCEDURE — 99024 POSTOP FOLLOW-UP VISIT: CPT | Performed by: ORTHOPAEDIC SURGERY

## 2021-08-12 NOTE — PROGRESS NOTES
[facility] Post-Op Clinic Visit    A/P: 60year old male s/p right thumb open reduction internal fixation of ulnar collateral ligament avulsion fracture progressing as expected. Fracture fragment is not fully united.   He may be going on to a fibrous nonuni

## 2022-07-21 ENCOUNTER — HOSPITAL ENCOUNTER (EMERGENCY)
Facility: HOSPITAL | Age: 57
Discharge: HOME | End: 2022-07-21
Attending: EMERGENCY MEDICINE
Payer: COMMERCIAL

## 2022-07-21 ENCOUNTER — APPOINTMENT (OUTPATIENT)
Dept: CT IMAGING | Facility: HOSPITAL | Age: 57
End: 2022-07-21
Payer: COMMERCIAL

## 2022-07-21 ENCOUNTER — APPOINTMENT (OUTPATIENT)
Dept: CT IMAGING | Facility: HOSPITAL | Age: 57
End: 2022-07-21
Attending: EMERGENCY MEDICINE
Payer: COMMERCIAL

## 2022-07-21 VITALS
SYSTOLIC BLOOD PRESSURE: 147 MMHG | OXYGEN SATURATION: 96 % | DIASTOLIC BLOOD PRESSURE: 90 MMHG | HEIGHT: 73 IN | HEART RATE: 93 BPM | WEIGHT: 235 LBS | RESPIRATION RATE: 18 BRPM | TEMPERATURE: 98 F | BODY MASS INDEX: 31.14 KG/M2

## 2022-07-21 DIAGNOSIS — S06.0X0A CONCUSSION WITHOUT LOSS OF CONSCIOUSNESS, INITIAL ENCOUNTER: Primary | ICD-10-CM

## 2022-07-21 PROCEDURE — 99284 EMERGENCY DEPT VISIT MOD MDM: CPT

## 2022-07-21 PROCEDURE — 72125 CT NECK SPINE W/O DYE: CPT | Performed by: EMERGENCY MEDICINE

## 2022-07-21 PROCEDURE — 70450 CT HEAD/BRAIN W/O DYE: CPT | Performed by: EMERGENCY MEDICINE

## 2022-07-21 RX ORDER — OMEPRAZOLE 20 MG/1
20 CAPSULE, DELAYED RELEASE ORAL
COMMUNITY

## 2022-07-21 RX ORDER — COVID-19 ANTIGEN TEST
220 KIT MISCELLANEOUS
COMMUNITY
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
## 2022-07-31 ENCOUNTER — HOSPITAL ENCOUNTER (EMERGENCY)
Facility: HOSPITAL | Age: 57
Discharge: HOME | End: 2022-07-31
Attending: EMERGENCY MEDICINE
Payer: COMMERCIAL

## 2022-07-31 VITALS
WEIGHT: 240 LBS | SYSTOLIC BLOOD PRESSURE: 152 MMHG | TEMPERATURE: 98 F | DIASTOLIC BLOOD PRESSURE: 105 MMHG | HEIGHT: 73 IN | BODY MASS INDEX: 31.81 KG/M2 | RESPIRATION RATE: 23 BRPM | HEART RATE: 84 BPM | OXYGEN SATURATION: 99 %

## 2022-07-31 DIAGNOSIS — S01.112A LEFT EYELID LACERATION, INITIAL ENCOUNTER: Primary | ICD-10-CM

## 2022-07-31 PROCEDURE — 99283 EMERGENCY DEPT VISIT LOW MDM: CPT

## 2022-07-31 PROCEDURE — 12013 RPR F/E/E/N/L/M 2.6-5.0 CM: CPT

## (undated) DEVICE — OCCLUSIVE GAUZE STRIP OVERWRAP,3% BISMUTH TRIBROMOPHENATE IN PETROLATUM BLEND: Brand: XEROFORM

## (undated) DEVICE — 3M™ STERI-STRIP™ REINFORCED ADHESIVE SKIN CLOSURES, R1547, 1/2 IN X 4 IN (12 MM X 100 MM), 6 STRIPS/ENVELOPE: Brand: 3M™ STERI-STRIP™

## (undated) DEVICE — PADDING CAST SOFT ROLL 3IN

## (undated) DEVICE — DISPOSABLE TOURNIQUET CUFF SINGLE BLADDER, DUAL PORT AND QUICK CONNECT CONNECTOR: Brand: COLOR CUFF

## (undated) DEVICE — SUTURE MONOCRYL 4-0 PS-2

## (undated) DEVICE — STERILE POLYISOPRENE POWDER-FREE SURGICAL GLOVES: Brand: PROTEXIS

## (undated) DEVICE — EXOFIN TISSUE ADHESIVE 1.0ML

## (undated) DEVICE — GOWN,SIRUS,FABRIC-REINFORCED,X-LARGE: Brand: MEDLINE

## (undated) DEVICE — SPLINT PLASTER 5

## (undated) DEVICE — STERILE POLYISOPRENE POWDER-FREE SURGICAL GLOVES WITH EMOLLIENT COATING: Brand: PROTEXIS

## (undated) DEVICE — BANDAGE COHESIVE 2IN

## (undated) DEVICE — REM POLYHESIVE ADULT PATIENT RETURN ELECTRODE: Brand: VALLEYLAB

## (undated) DEVICE — 1010 S-DRAPE TOWEL DRAPE 10/BX: Brand: STERI-DRAPE™

## (undated) DEVICE — NEEDLE ANCHOR 1827-4D

## (undated) DEVICE — UNDYED BRAIDED (POLYGLACTIN 910), SYNTHETIC ABSORBABLE SUTURE: Brand: COATED VICRYL

## (undated) DEVICE — PIN GUARD 0.0135 BLUE

## (undated) DEVICE — PADDING CAST COTTON STER 3

## (undated) DEVICE — SOL  .9 1000ML BTL

## (undated) DEVICE — SUTURE FIBERWIRE S AR-7200

## (undated) DEVICE — SCD SLEEVE KNEE HI BLEND

## (undated) DEVICE — STERILE HOOK LOCK LATEX FREE ELASTIC BANDAGE 2INX5YD: Brand: HOOK LOCK™

## (undated) DEVICE — ESSENTIAL SHOULDER SLING L

## (undated) DEVICE — CLEAR MONOFILAMENT (POLYDIOXANONE), ABSORBABLE SURGICAL SUTURE: Brand: PDS

## (undated) DEVICE — UPPER EXTREMITY CDS-LF: Brand: MEDLINE INDUSTRIES, INC.

## (undated) DEVICE — DISPOSABLE BIPOLAR FORCEPS 4" (10.2CM) JEWELERS, STRAIGHT 0.4MM TIP AND 12 FT. (3.6M) CABLE: Brand: KIRWAN

## (undated) NOTE — ED AVS SNAPSHOT
[facility] in [address]    Phone:  [phone]    Fax:  [phone]           Mr. Garret Gonzalez   MRN: [id number]    Department:  [facility]   Date of Visit:  3/19/2017 Use the Flonase 2 sprays each nostril once daily after shower for at least the next 2 weeks or as long as you are congested. You may continue with the Mucinex during the daytime.   You may take 5-10 mL of Cheratussin with codeine at bedtime as needed for c You were examined and treated today on an urgent basis only. This was not a substitute for ongoing medical care. Often, one Immediate Care visit does not uncover every injury or illness.  If you have been referred to a primary care or a specialist physicia Miah Al[address] ([address]) [address]  [employer] ([place]) [phone] [address] ([address]) [phone]                Additional Information       We are concerned for your o

## (undated) NOTE — LETTER
Patient Name: West Harrell  YOB: 1965          MRN number:  [id number]  Date:  8/3/2021  Referring Physician:  Jenni Cadena    Dear Dr. Healy Six Summary  Pt has attended 5, cancelled 0, and no shown 0 visits in Occupational Ther 22# (+5) 22#       Goals: (to be met in 12 visits)  1-  Decrease C/O pain to 0/10 with functional use 8 visits MET  2- Increase R thumb MP jnt flx to 50 degrees to safely  steering wheel and bike handlebars.  12 visits not fully met (45) ongoing w/ HEP

## (undated) NOTE — LETTER
21  [facility]  Pre-Operative Clearance Request    Patient Name:   Harpreet Aguirre             :   10/27/1965    Surgeon: Dr. Lofton Camera               Date of Surgery: 21     Surgical Procedure: ORIF of Right Th

## (undated) NOTE — ED AVS SNAPSHOT
Mr. Maria Victoria Baltazar   MRN: [id number]    Department:  [facility] Emergency Department   Date of Visit:  9/3/2017           Disclosure     Insurance plans vary and the physician(s) referred by the ER may not be covered by your plan.  Please contact y If you have been prescribed any medication(s), please fill your prescription right away and begin taking the medication(s) as directed    If the emergency physician has read X-rays, these will be re-interpreted by a radiologist.  If there is a significant